# Patient Record
Sex: MALE | Race: WHITE | NOT HISPANIC OR LATINO | Employment: OTHER | URBAN - METROPOLITAN AREA
[De-identification: names, ages, dates, MRNs, and addresses within clinical notes are randomized per-mention and may not be internally consistent; named-entity substitution may affect disease eponyms.]

---

## 2020-05-13 ENCOUNTER — NEW PATIENT EMERGENCY (OUTPATIENT)
Dept: URBAN - METROPOLITAN AREA CLINIC 43 | Facility: CLINIC | Age: 75
End: 2020-05-13

## 2020-05-13 DIAGNOSIS — H35.3131: ICD-10-CM

## 2020-05-13 DIAGNOSIS — H25.093: ICD-10-CM

## 2020-05-13 DIAGNOSIS — H43.813: ICD-10-CM

## 2020-05-13 PROCEDURE — 92002 INTRM OPH EXAM NEW PATIENT: CPT

## 2020-05-13 ASSESSMENT — VISUAL ACUITY
OS_CC: 20/25-1
OD_CC: 20/30+2

## 2020-05-13 ASSESSMENT — TONOMETRY
OD_IOP_MMHG: 9
OS_IOP_MMHG: 10

## 2020-12-03 ENCOUNTER — APPOINTMENT (RX ONLY)
Dept: URBAN - METROPOLITAN AREA CLINIC 153 | Facility: CLINIC | Age: 75
Setting detail: DERMATOLOGY
End: 2020-12-03

## 2020-12-03 DIAGNOSIS — L82.1 OTHER SEBORRHEIC KERATOSIS: ICD-10-CM

## 2020-12-03 DIAGNOSIS — D18.0 HEMANGIOMA: ICD-10-CM

## 2020-12-03 DIAGNOSIS — L82.0 INFLAMED SEBORRHEIC KERATOSIS: ICD-10-CM

## 2020-12-03 DIAGNOSIS — D22 MELANOCYTIC NEVI: ICD-10-CM

## 2020-12-03 DIAGNOSIS — L57.0 ACTINIC KERATOSIS: ICD-10-CM

## 2020-12-03 PROBLEM — D18.01 HEMANGIOMA OF SKIN AND SUBCUTANEOUS TISSUE: Status: ACTIVE | Noted: 2020-12-03

## 2020-12-03 PROBLEM — D22.5 MELANOCYTIC NEVI OF TRUNK: Status: ACTIVE | Noted: 2020-12-03

## 2020-12-03 PROCEDURE — ? TREATMENT REGIMEN

## 2020-12-03 PROCEDURE — ? COUNSELING

## 2020-12-03 PROCEDURE — 99203 OFFICE O/P NEW LOW 30 MIN: CPT | Mod: 25

## 2020-12-03 PROCEDURE — 17110 DESTRUCTION B9 LES UP TO 14: CPT

## 2020-12-03 PROCEDURE — ? LIQUID NITROGEN

## 2020-12-03 PROCEDURE — ? PRESCRIPTION

## 2020-12-03 RX ORDER — FLUOROURACIL 2 G/40G
1 CREAM TOPICAL BID
Qty: 1 | Refills: 0 | Status: ERX | COMMUNITY
Start: 2020-12-03

## 2020-12-03 RX ADMIN — FLUOROURACIL 1: 2 CREAM TOPICAL at 00:00

## 2020-12-03 ASSESSMENT — LOCATION SIMPLE DESCRIPTION DERM
LOCATION SIMPLE: POSTERIOR SCALP
LOCATION SIMPLE: RIGHT NOSE
LOCATION SIMPLE: CHEST
LOCATION SIMPLE: LEFT CHEEK
LOCATION SIMPLE: LEFT UPPER ARM
LOCATION SIMPLE: RIGHT CHEEK

## 2020-12-03 ASSESSMENT — LOCATION ZONE DERM
LOCATION ZONE: TRUNK
LOCATION ZONE: ARM
LOCATION ZONE: FACE
LOCATION ZONE: NOSE
LOCATION ZONE: SCALP

## 2020-12-03 ASSESSMENT — LOCATION DETAILED DESCRIPTION DERM
LOCATION DETAILED: LEFT INFERIOR LATERAL MALAR CHEEK
LOCATION DETAILED: LEFT MEDIAL INFERIOR CHEST
LOCATION DETAILED: LEFT LATERAL INFERIOR CHEST
LOCATION DETAILED: MID-OCCIPITAL SCALP
LOCATION DETAILED: RIGHT NASAL SIDEWALL
LOCATION DETAILED: RIGHT LATERAL MALAR CHEEK
LOCATION DETAILED: LEFT PROXIMAL POSTERIOR UPPER ARM

## 2020-12-03 NOTE — PROCEDURE: TREATMENT REGIMEN
Plan: Patient to return to the office to recheck 5FU. Patient to contact the office if any problems occur for further evaluation and management.
Initiate Treatment: Efudex 5 % topical cream Apply twice daily to the scalp,cheeks and spot on right nose for 8-11 days until follow up appt.
Detail Level: Simple

## 2020-12-29 RX ORDER — FLUOROURACIL 2 G/40G
1 CREAM TOPICAL BID
Qty: 1 | Refills: 0 | Status: ERX

## 2021-01-08 ENCOUNTER — APPOINTMENT (RX ONLY)
Dept: URBAN - METROPOLITAN AREA CLINIC 153 | Facility: CLINIC | Age: 76
Setting detail: DERMATOLOGY
End: 2021-01-08

## 2021-01-08 DIAGNOSIS — L57.0 ACTINIC KERATOSIS: ICD-10-CM

## 2021-01-08 PROCEDURE — ? TREATMENT REGIMEN

## 2021-01-08 PROCEDURE — 99213 OFFICE O/P EST LOW 20 MIN: CPT

## 2021-01-08 ASSESSMENT — LOCATION SIMPLE DESCRIPTION DERM
LOCATION SIMPLE: LEFT CHEEK
LOCATION SIMPLE: ANTERIOR SCALP
LOCATION SIMPLE: NOSE
LOCATION SIMPLE: RIGHT EAR
LOCATION SIMPLE: LEFT EAR
LOCATION SIMPLE: RIGHT CHEEK

## 2021-01-08 ASSESSMENT — LOCATION ZONE DERM
LOCATION ZONE: FACE
LOCATION ZONE: EAR
LOCATION ZONE: NOSE
LOCATION ZONE: SCALP

## 2021-01-08 ASSESSMENT — LOCATION DETAILED DESCRIPTION DERM
LOCATION DETAILED: MID-FRONTAL SCALP
LOCATION DETAILED: LEFT CAVUM CONCHA
LOCATION DETAILED: LEFT CENTRAL MALAR CHEEK
LOCATION DETAILED: RIGHT ANTITRAGUS
LOCATION DETAILED: RIGHT CENTRAL MALAR CHEEK
LOCATION DETAILED: NASAL TIP

## 2021-01-19 ENCOUNTER — APPOINTMENT (RX ONLY)
Dept: URBAN - METROPOLITAN AREA CLINIC 153 | Facility: CLINIC | Age: 76
Setting detail: DERMATOLOGY
End: 2021-01-19

## 2021-01-19 DIAGNOSIS — L57.0 ACTINIC KERATOSIS: ICD-10-CM

## 2021-01-19 PROCEDURE — ? TREATMENT REGIMEN

## 2021-01-19 PROCEDURE — ? COUNSELING

## 2021-01-19 PROCEDURE — 99212 OFFICE O/P EST SF 10 MIN: CPT

## 2021-01-19 ASSESSMENT — LOCATION DETAILED DESCRIPTION DERM
LOCATION DETAILED: LEFT ANTERIOR EARLOBE
LOCATION DETAILED: RIGHT ANTERIOR EARLOBE
LOCATION DETAILED: NASAL DORSUM
LOCATION DETAILED: LEFT INFERIOR CENTRAL MALAR CHEEK

## 2021-01-19 ASSESSMENT — LOCATION SIMPLE DESCRIPTION DERM
LOCATION SIMPLE: NOSE
LOCATION SIMPLE: LEFT EAR
LOCATION SIMPLE: RIGHT EAR
LOCATION SIMPLE: LEFT CHEEK

## 2021-01-19 ASSESSMENT — LOCATION ZONE DERM
LOCATION ZONE: NOSE
LOCATION ZONE: FACE
LOCATION ZONE: EAR

## 2021-01-19 NOTE — PROCEDURE: TREATMENT REGIMEN
Detail Level: Simple
Discontinue Regimen: Patient to discontinue applying 5FU to nose, left cheek, left ear, right ear.
Continue Regimen: Patient to continue applying 5FU to scalp for 3 more days.
Plan: Patient is having a great reaction the medication. Patient will return to the office in 6 weeks to recheck 5FU. Patient to contact the office if any problems occur for further evaluation and management.

## 2021-02-16 ENCOUNTER — ESTABLISHED COMPREHENSIVE EXAM (OUTPATIENT)
Dept: URBAN - METROPOLITAN AREA CLINIC 43 | Facility: CLINIC | Age: 76
End: 2021-02-16

## 2021-02-16 DIAGNOSIS — H25.813: ICD-10-CM

## 2021-02-16 DIAGNOSIS — H35.3131: ICD-10-CM

## 2021-02-16 DIAGNOSIS — H35.363: ICD-10-CM

## 2021-02-16 PROCEDURE — 92015 DETERMINE REFRACTIVE STATE: CPT

## 2021-02-16 PROCEDURE — 92014 COMPRE OPH EXAM EST PT 1/>: CPT

## 2021-02-16 ASSESSMENT — VISUAL ACUITY
OS_PH: 20/40-1
OD_CC: 20/40+1
OD_CC: J4
OD_SC: -J12
OS_SC: 20/100
OD_SC: 20/200
OS_CC: 20/50+1
OS_SC: J12 FUZZY
OS_CC: J3

## 2021-02-16 ASSESSMENT — TONOMETRY
OD_IOP_MMHG: 15
OS_IOP_MMHG: 15

## 2021-03-02 ENCOUNTER — APPOINTMENT (RX ONLY)
Dept: URBAN - METROPOLITAN AREA CLINIC 153 | Facility: CLINIC | Age: 76
Setting detail: DERMATOLOGY
End: 2021-03-02

## 2021-03-02 DIAGNOSIS — L21.8 OTHER SEBORRHEIC DERMATITIS: ICD-10-CM | Status: WORSENING

## 2021-03-02 DIAGNOSIS — L57.0 ACTINIC KERATOSIS: ICD-10-CM | Status: IMPROVED

## 2021-03-02 DIAGNOSIS — Z71.89 OTHER SPECIFIED COUNSELING: ICD-10-CM

## 2021-03-02 PROBLEM — L30.9 DERMATITIS, UNSPECIFIED: Status: ACTIVE | Noted: 2021-03-02

## 2021-03-02 PROCEDURE — ? TREATMENT REGIMEN

## 2021-03-02 PROCEDURE — 17000 DESTRUCT PREMALG LESION: CPT

## 2021-03-02 PROCEDURE — ? LIQUID NITROGEN

## 2021-03-02 PROCEDURE — ? PRESCRIPTION

## 2021-03-02 PROCEDURE — 99213 OFFICE O/P EST LOW 20 MIN: CPT | Mod: 25

## 2021-03-02 PROCEDURE — ? COUNSELING

## 2021-03-02 RX ORDER — HYDROCORTISONE 25 MG/ML
1 LOTION TOPICAL BID
Qty: 1 | Refills: 0 | Status: ERX | COMMUNITY
Start: 2021-03-02

## 2021-03-02 RX ORDER — HYDROCORTISONE 25 MG/ML
LOTION TOPICAL BID
Qty: 1 | Refills: 0 | Status: ERX

## 2021-03-02 RX ADMIN — HYDROCORTISONE 1: 25 LOTION TOPICAL at 00:00

## 2021-03-02 ASSESSMENT — LOCATION DETAILED DESCRIPTION DERM
LOCATION DETAILED: LEFT INFERIOR MEDIAL MALAR CHEEK
LOCATION DETAILED: NASAL SUPRATIP
LOCATION DETAILED: LEFT SUPERIOR CRUS OF ANTIHELIX
LOCATION DETAILED: LEFT SUPERIOR PARIETAL SCALP
LOCATION DETAILED: RIGHT SCAPHA
LOCATION DETAILED: LEFT CENTRAL MALAR CHEEK
LOCATION DETAILED: LEFT SUPERIOR HELIX
LOCATION DETAILED: POSTERIOR MID-PARIETAL SCALP
LOCATION DETAILED: RIGHT CYMBA CONCHA

## 2021-03-02 ASSESSMENT — LOCATION ZONE DERM
LOCATION ZONE: EAR
LOCATION ZONE: FACE
LOCATION ZONE: SCALP
LOCATION ZONE: NOSE

## 2021-03-02 ASSESSMENT — LOCATION SIMPLE DESCRIPTION DERM
LOCATION SIMPLE: LEFT CHEEK
LOCATION SIMPLE: POSTERIOR SCALP
LOCATION SIMPLE: RIGHT EAR
LOCATION SIMPLE: SCALP
LOCATION SIMPLE: LEFT EAR
LOCATION SIMPLE: NOSE

## 2021-03-02 NOTE — PROCEDURE: LIQUID NITROGEN
Render Post-Care Instructions In Note?: no
Post-Care Instructions: I reviewed with the patient in detail post-care instructions. Patient is to wear sunprotection, and avoid picking at any of the treated lesions. Pt may apply Vaseline to crusted or scabbing areas.
Duration Of Freeze Thaw-Cycle (Seconds): 3
Consent: The patient's consent was obtained including but not limited to risks of crusting, scabbing, blistering, scarring, darker or lighter pigmentary change, recurrence, incomplete removal and infection.
Detail Level: Detailed
Number Of Freeze-Thaw Cycles: 1 freeze-thaw cycle

## 2021-03-02 NOTE — PROCEDURE: TREATMENT REGIMEN
Detail Level: Simple
Plan to treat with hydrocortisone 2.5 % lotion, apply twice daily to face, scalp, right ear, and left ear.  We will recheck in 10 days. Patient is aware to moisturize with Aquaphor or Vaseline and to wear sunscreen on a daily basis.  Patient is aware to contact the office if they have any questions or concerns.
Detail Level: Zone

## 2021-04-12 ENCOUNTER — CONSULT (OUTPATIENT)
Dept: URBAN - METROPOLITAN AREA CLINIC 43 | Facility: CLINIC | Age: 76
End: 2021-04-12

## 2021-04-12 DIAGNOSIS — H35.363: ICD-10-CM

## 2021-04-12 DIAGNOSIS — H35.723: ICD-10-CM

## 2021-04-12 DIAGNOSIS — H35.3132: ICD-10-CM

## 2021-04-12 DIAGNOSIS — H43.813: ICD-10-CM

## 2021-04-12 DIAGNOSIS — H34.232: ICD-10-CM

## 2021-04-12 PROCEDURE — 99214 OFFICE O/P EST MOD 30 MIN: CPT

## 2021-04-12 PROCEDURE — 92250 FUNDUS PHOTOGRAPHY W/I&R: CPT

## 2021-04-12 ASSESSMENT — TONOMETRY
OS_IOP_MMHG: 12
OD_IOP_MMHG: 11

## 2021-04-12 ASSESSMENT — VISUAL ACUITY
OD_CC: 20/30-2
OS_CC: 20/30

## 2021-05-06 ENCOUNTER — APPOINTMENT (RX ONLY)
Dept: URBAN - METROPOLITAN AREA CLINIC 153 | Facility: CLINIC | Age: 76
Setting detail: DERMATOLOGY
End: 2021-05-06

## 2021-05-06 DIAGNOSIS — L21.8 OTHER SEBORRHEIC DERMATITIS: ICD-10-CM | Status: IMPROVED

## 2021-05-06 DIAGNOSIS — L57.0 ACTINIC KERATOSIS: ICD-10-CM

## 2021-05-06 DIAGNOSIS — Z71.89 OTHER SPECIFIED COUNSELING: ICD-10-CM

## 2021-05-06 PROCEDURE — 17000 DESTRUCT PREMALG LESION: CPT

## 2021-05-06 PROCEDURE — ? LIQUID NITROGEN

## 2021-05-06 PROCEDURE — ? COUNSELING

## 2021-05-06 PROCEDURE — 99213 OFFICE O/P EST LOW 20 MIN: CPT | Mod: 25

## 2021-05-06 PROCEDURE — ? TREATMENT REGIMEN

## 2021-05-06 ASSESSMENT — LOCATION DETAILED DESCRIPTION DERM
LOCATION DETAILED: SUPERIOR MID FOREHEAD
LOCATION DETAILED: LEFT INFERIOR CENTRAL MALAR CHEEK
LOCATION DETAILED: LEFT SUPERIOR PREAURICULAR CHEEK
LOCATION DETAILED: POSTERIOR MID-PARIETAL SCALP
LOCATION DETAILED: RIGHT CHIN
LOCATION DETAILED: RIGHT CAVUM CONCHA
LOCATION DETAILED: RIGHT CENTRAL MALAR CHEEK
LOCATION DETAILED: LEFT CRUS OF HELIX

## 2021-05-06 ASSESSMENT — LOCATION SIMPLE DESCRIPTION DERM
LOCATION SIMPLE: LEFT CHEEK
LOCATION SIMPLE: LEFT EAR
LOCATION SIMPLE: RIGHT EAR
LOCATION SIMPLE: CHIN
LOCATION SIMPLE: RIGHT CHEEK
LOCATION SIMPLE: POSTERIOR SCALP
LOCATION SIMPLE: SUPERIOR FOREHEAD

## 2021-05-06 ASSESSMENT — LOCATION ZONE DERM
LOCATION ZONE: EAR
LOCATION ZONE: FACE
LOCATION ZONE: SCALP

## 2021-05-06 NOTE — PROCEDURE: TREATMENT REGIMEN
Plan: Patient to discontinue hydrocortisone 2.5% lotion at this time. Patient to apply hydrocortisone 2.5% lotion as needed for scaling and flaking on scalp, face and ears. Patient is aware to not apply more then two weeks at a time. Upon examining found residual actinic keratosis present, will treat today with liquid nitrogen. Patient is agreeable. Patient to moisturize and apply sunscreen on a daily basis. Patient is aware to contact the office if he has any questions, problems or concerns.
Detail Level: Zone

## 2021-05-21 ENCOUNTER — APPOINTMENT (RX ONLY)
Dept: URBAN - METROPOLITAN AREA CLINIC 153 | Facility: CLINIC | Age: 76
Setting detail: DERMATOLOGY
End: 2021-05-21

## 2021-05-21 DIAGNOSIS — B07.8 OTHER VIRAL WARTS: ICD-10-CM

## 2021-05-21 PROCEDURE — ? LIQUID NITROGEN

## 2021-05-21 PROCEDURE — 17110 DESTRUCTION B9 LES UP TO 14: CPT

## 2021-05-21 ASSESSMENT — LOCATION DETAILED DESCRIPTION DERM: LOCATION DETAILED: LEFT DORSAL RING METACARPOPHALANGEAL JOINT

## 2021-05-21 ASSESSMENT — LOCATION ZONE DERM: LOCATION ZONE: HAND

## 2021-05-21 ASSESSMENT — LOCATION SIMPLE DESCRIPTION DERM: LOCATION SIMPLE: LEFT HAND

## 2021-05-21 NOTE — PROCEDURE: LIQUID NITROGEN
Add 52 Modifier (Optional): no
Medical Necessity Information: It is in your best interest to select a reason for this procedure from the list below. All of these items fulfill various CMS LCD requirements except the new and changing color options.
Detail Level: Detailed
Medical Necessity Clause: This procedure was medically necessary because the lesions that were treated were:
Duration Of Freeze Thaw-Cycle (Seconds): 3
Post-Care Instructions: I reviewed with the patient in detail post-care instructions. Patient is to wear sunprotection, and avoid picking at any of the treated lesions. Pt may apply Vaseline to crusted or scabbing areas. If any area treated is still present after four weeks patient was instructed to contact the office for further evaluation.
Number Of Freeze-Thaw Cycles: 3 freeze-thaw cycles
Consent: The patient's verbal consent was obtained including but not limited to risks of crusting, scabbing, blistering, scarring, darker or lighter pigmentary change, recurrence, incomplete removal and infection.

## 2022-02-18 ENCOUNTER — COMPREHENSIVE EXAM (OUTPATIENT)
Dept: URBAN - METROPOLITAN AREA CLINIC 43 | Facility: CLINIC | Age: 77
End: 2022-02-18

## 2022-02-18 DIAGNOSIS — H25.813: ICD-10-CM

## 2022-02-18 DIAGNOSIS — H34.232: ICD-10-CM

## 2022-02-18 DIAGNOSIS — H35.363: ICD-10-CM

## 2022-02-18 DIAGNOSIS — H52.4: ICD-10-CM

## 2022-02-18 DIAGNOSIS — H35.3132: ICD-10-CM

## 2022-02-18 DIAGNOSIS — H04.123: ICD-10-CM

## 2022-02-18 PROCEDURE — 92015 DETERMINE REFRACTIVE STATE: CPT

## 2022-02-18 PROCEDURE — 92014 COMPRE OPH EXAM EST PT 1/>: CPT

## 2022-02-18 ASSESSMENT — TONOMETRY
OS_IOP_MMHG: 11
OD_IOP_MMHG: 11

## 2022-02-18 ASSESSMENT — VISUAL ACUITY
OD_SC: 20/200
OD_SC: J12
OD_CC: J8
OS_SC: J12
OD_CC: 20/40-2
OS_BAT: 20/400
OD_BAT: 20/70-2
OS_SC: 20/80
OS_CC: J6
OS_CC: 20/40-2

## 2022-04-22 ENCOUNTER — COMPREHENSIVE EXAM (OUTPATIENT)
Dept: URBAN - METROPOLITAN AREA CLINIC 43 | Facility: CLINIC | Age: 77
End: 2022-04-22

## 2022-04-22 DIAGNOSIS — H34.232: ICD-10-CM

## 2022-04-22 DIAGNOSIS — H35.363: ICD-10-CM

## 2022-04-22 DIAGNOSIS — H35.723: ICD-10-CM

## 2022-04-22 DIAGNOSIS — H35.3132: ICD-10-CM

## 2022-04-22 DIAGNOSIS — G45.3: ICD-10-CM

## 2022-04-22 DIAGNOSIS — H35.30: ICD-10-CM

## 2022-04-22 DIAGNOSIS — H43.813: ICD-10-CM

## 2022-04-22 PROCEDURE — 99214 OFFICE O/P EST MOD 30 MIN: CPT

## 2022-04-22 PROCEDURE — 92250 FUNDUS PHOTOGRAPHY W/I&R: CPT

## 2022-04-22 ASSESSMENT — VISUAL ACUITY
OD_CC: 20/40+1
OS_PH: 20/50-1
OS_CC: 20/60+2

## 2022-04-22 ASSESSMENT — TONOMETRY
OS_IOP_MMHG: 9
OD_IOP_MMHG: 10

## 2022-11-04 ENCOUNTER — COMPREHENSIVE EXAM (OUTPATIENT)
Dept: URBAN - METROPOLITAN AREA CLINIC 43 | Facility: CLINIC | Age: 77
End: 2022-11-04

## 2022-11-04 DIAGNOSIS — H35.30: ICD-10-CM

## 2022-11-04 DIAGNOSIS — H35.363: ICD-10-CM

## 2022-11-04 DIAGNOSIS — H35.723: ICD-10-CM

## 2022-11-04 DIAGNOSIS — H34.232: ICD-10-CM

## 2022-11-04 DIAGNOSIS — H25.813: ICD-10-CM

## 2022-11-04 DIAGNOSIS — G45.3: ICD-10-CM

## 2022-11-04 DIAGNOSIS — H43.813: ICD-10-CM

## 2022-11-04 DIAGNOSIS — H04.123: ICD-10-CM

## 2022-11-04 DIAGNOSIS — H10.13: ICD-10-CM

## 2022-11-04 DIAGNOSIS — H35.3132: ICD-10-CM

## 2022-11-04 PROCEDURE — 92273 FULL FIELD ERG W/I&R: CPT

## 2022-11-04 PROCEDURE — 92250 FUNDUS PHOTOGRAPHY W/I&R: CPT

## 2022-11-04 PROCEDURE — 99214 OFFICE O/P EST MOD 30 MIN: CPT

## 2022-11-04 ASSESSMENT — TONOMETRY
OS_IOP_MMHG: 11
OD_IOP_MMHG: 11

## 2022-11-04 ASSESSMENT — VISUAL ACUITY
OS_CC: 20/50+2
OD_CC: 20/40+1

## 2023-09-06 ENCOUNTER — CONSULTATION/EVALUATION (OUTPATIENT)
Dept: URBAN - METROPOLITAN AREA CLINIC 43 | Facility: CLINIC | Age: 78
End: 2023-09-06

## 2023-09-06 DIAGNOSIS — H34.232: ICD-10-CM

## 2023-09-06 DIAGNOSIS — H35.3132: ICD-10-CM

## 2023-09-06 DIAGNOSIS — H04.123: ICD-10-CM

## 2023-09-06 DIAGNOSIS — H25.813: ICD-10-CM

## 2023-09-06 DIAGNOSIS — H35.723: ICD-10-CM

## 2023-09-06 PROCEDURE — 99214 OFFICE O/P EST MOD 30 MIN: CPT

## 2023-09-06 PROCEDURE — 92025-1 CORNEAL TOPOGRAPHY, INS

## 2023-09-06 PROCEDURE — V2799PMN IMPRIMIS PRED-MOXI-NEPAF 5ML

## 2023-09-06 PROCEDURE — 92136TC INTERFEROMETRY - TECHNICAL COMPONENT

## 2023-09-06 PROCEDURE — 92134 CPTRZ OPH DX IMG PST SGM RTA: CPT

## 2023-09-06 ASSESSMENT — VISUAL ACUITY
OS_CC: 20/40+2
OD_CC: 20/50
OD_CC: J2
OS_CC: J2

## 2023-09-06 ASSESSMENT — TONOMETRY
OS_IOP_MMHG: 16
OD_IOP_MMHG: 15

## 2023-09-22 ENCOUNTER — PRE-OP/H&P (OUTPATIENT)
Dept: URBAN - METROPOLITAN AREA SURGERY 14 | Facility: SURGERY | Age: 78
End: 2023-09-22

## 2023-09-22 ENCOUNTER — TECH ONLY (OUTPATIENT)
Dept: URBAN - METROPOLITAN AREA CLINIC 39 | Facility: CLINIC | Age: 78
End: 2023-09-22

## 2023-09-22 ENCOUNTER — SURGERY/PROCEDURE (OUTPATIENT)
Dept: URBAN - METROPOLITAN AREA CLINIC 43 | Facility: CLINIC | Age: 78
End: 2023-09-22

## 2023-09-22 DIAGNOSIS — H04.123: ICD-10-CM

## 2023-09-22 DIAGNOSIS — H25.813: ICD-10-CM

## 2023-09-22 DIAGNOSIS — H35.3132: ICD-10-CM

## 2023-09-22 DIAGNOSIS — Z96.1: ICD-10-CM

## 2023-09-22 DIAGNOSIS — H34.232: ICD-10-CM

## 2023-09-22 PROCEDURE — 66999LNSR LENSAR LASER FOR CAT SX

## 2023-09-22 PROCEDURE — 66984CV REMOVE CATARACT, INSERT LENS, CUSTOM VISION

## 2023-09-22 PROCEDURE — 99211T TECH SERVICE

## 2023-09-22 PROCEDURE — 99211HP H&P OFFICE/OUTPATIENT VISIT, EST

## 2023-09-22 PROCEDURE — 65772LRI LRI DURING CAT SX

## 2023-09-22 ASSESSMENT — TONOMETRY: OD_IOP_MMHG: 9

## 2023-09-22 ASSESSMENT — VISUAL ACUITY: OD_SC: 20/50-1

## 2023-09-25 ENCOUNTER — POST-OP (OUTPATIENT)
Dept: URBAN - METROPOLITAN AREA CLINIC 43 | Facility: CLINIC | Age: 78
End: 2023-09-25

## 2023-09-25 DIAGNOSIS — H04.123: ICD-10-CM

## 2023-09-25 DIAGNOSIS — H35.3132: ICD-10-CM

## 2023-09-25 DIAGNOSIS — Z96.1: ICD-10-CM

## 2023-09-25 DIAGNOSIS — H25.812: ICD-10-CM

## 2023-09-25 PROCEDURE — 99024 POSTOP FOLLOW-UP VISIT: CPT

## 2023-09-25 PROCEDURE — 99213 OFFICE O/P EST LOW 20 MIN: CPT

## 2023-09-25 ASSESSMENT — TONOMETRY
OS_IOP_MMHG: 10
OD_IOP_MMHG: 07

## 2023-09-25 ASSESSMENT — VISUAL ACUITY
OS_SC: J12
OD_SC: 20/25+1
OU_SC: J6
OS_PH: 20/30-2
OD_SC: J8
OU_SC: 20/20
OS_SC: 20/80+2

## 2023-09-27 ENCOUNTER — POST OP/EVAL OF SECOND EYE (OUTPATIENT)
Dept: URBAN - METROPOLITAN AREA CLINIC 43 | Facility: CLINIC | Age: 78
End: 2023-09-27

## 2023-09-27 DIAGNOSIS — Z96.1: ICD-10-CM

## 2023-09-27 DIAGNOSIS — H35.3132: ICD-10-CM

## 2023-09-27 DIAGNOSIS — H25.812: ICD-10-CM

## 2023-09-27 DIAGNOSIS — H04.123: ICD-10-CM

## 2023-09-27 PROCEDURE — 99024 POSTOP FOLLOW-UP VISIT: CPT

## 2023-09-27 PROCEDURE — 99213 OFFICE O/P EST LOW 20 MIN: CPT

## 2023-09-27 ASSESSMENT — VISUAL ACUITY
OD_SC: 20/25+1
OS_SC: J12
OD_SC: J8
OS_PH: 20/30
OS_SC: 20/60-1

## 2023-09-27 ASSESSMENT — TONOMETRY
OS_IOP_MMHG: 8
OD_IOP_MMHG: 6

## 2023-09-29 ENCOUNTER — PRE-OP/H&P (OUTPATIENT)
Dept: URBAN - METROPOLITAN AREA SURGERY 14 | Facility: SURGERY | Age: 78
End: 2023-09-29

## 2023-09-29 ENCOUNTER — SURGERY/PROCEDURE (OUTPATIENT)
Dept: URBAN - METROPOLITAN AREA CLINIC 43 | Facility: CLINIC | Age: 78
End: 2023-09-29

## 2023-09-29 ENCOUNTER — TECH ONLY (OUTPATIENT)
Dept: URBAN - METROPOLITAN AREA CLINIC 39 | Facility: CLINIC | Age: 78
End: 2023-09-29

## 2023-09-29 DIAGNOSIS — H25.812: ICD-10-CM

## 2023-09-29 DIAGNOSIS — Z96.1: ICD-10-CM

## 2023-09-29 DIAGNOSIS — H57.03: ICD-10-CM

## 2023-09-29 PROCEDURE — 99211T TECH SERVICE

## 2023-09-29 PROCEDURE — 99211HP H&P OFFICE/OUTPATIENT VISIT, EST

## 2023-09-29 PROCEDURE — 65772LRI LRI DURING CAT SX

## 2023-09-29 PROCEDURE — 66982CV COMPLEX CATARACT WITH IOL, CUSTOM VISION

## 2023-09-29 PROCEDURE — 66999LNSR LENSAR LASER FOR CAT SX

## 2023-09-29 ASSESSMENT — VISUAL ACUITY: OS_SC: 20/60

## 2023-09-29 ASSESSMENT — TONOMETRY: OS_IOP_MMHG: 16

## 2023-10-02 ENCOUNTER — POST-OP (OUTPATIENT)
Dept: URBAN - METROPOLITAN AREA CLINIC 43 | Facility: CLINIC | Age: 78
End: 2023-10-02

## 2023-10-02 DIAGNOSIS — Z96.1: ICD-10-CM

## 2023-10-02 PROCEDURE — 99024 POSTOP FOLLOW-UP VISIT: CPT

## 2023-10-02 ASSESSMENT — TONOMETRY
OD_IOP_MMHG: 14
OS_IOP_MMHG: 15

## 2023-10-02 ASSESSMENT — VISUAL ACUITY
OD_SC: 20/25+2
OS_SC: 20/25-2

## 2023-10-09 ENCOUNTER — ESTABLISHED PATIENT (OUTPATIENT)
Dept: URBAN - METROPOLITAN AREA CLINIC 43 | Facility: CLINIC | Age: 78
End: 2023-10-09

## 2023-10-09 DIAGNOSIS — H35.731: ICD-10-CM

## 2023-10-09 DIAGNOSIS — Z96.1: ICD-10-CM

## 2023-10-09 DIAGNOSIS — H34.232: ICD-10-CM

## 2023-10-09 DIAGNOSIS — G45.3: ICD-10-CM

## 2023-10-09 DIAGNOSIS — H04.123: ICD-10-CM

## 2023-10-09 DIAGNOSIS — H35.30: ICD-10-CM

## 2023-10-09 DIAGNOSIS — H35.3211: ICD-10-CM

## 2023-10-09 DIAGNOSIS — H35.3122: ICD-10-CM

## 2023-10-09 DIAGNOSIS — H35.363: ICD-10-CM

## 2023-10-09 DIAGNOSIS — H35.722: ICD-10-CM

## 2023-10-09 DIAGNOSIS — H43.813: ICD-10-CM

## 2023-10-09 PROCEDURE — 99214 OFFICE O/P EST MOD 30 MIN: CPT | Mod: 24,25

## 2023-10-09 PROCEDURE — 67028 INJECTION EYE DRUG: CPT

## 2023-10-09 PROCEDURE — J3490AVA AVASTIN *

## 2023-10-09 PROCEDURE — 92250 FUNDUS PHOTOGRAPHY W/I&R: CPT

## 2023-10-09 ASSESSMENT — TONOMETRY
OD_IOP_MMHG: 06
OS_IOP_MMHG: 07

## 2023-10-09 ASSESSMENT — VISUAL ACUITY
OD_SC: 20/20-1
OS_SC: 20/25-2

## 2023-11-13 ENCOUNTER — CLINIC PROCEDURE ONLY (OUTPATIENT)
Dept: URBAN - METROPOLITAN AREA CLINIC 43 | Facility: CLINIC | Age: 78
End: 2023-11-13

## 2023-11-13 DIAGNOSIS — H35.3211: ICD-10-CM

## 2023-11-13 DIAGNOSIS — H35.731: ICD-10-CM

## 2023-11-13 PROCEDURE — 67028 INJECTION EYE DRUG: CPT

## 2023-11-13 PROCEDURE — J3490AVA AVASTIN *

## 2023-11-13 PROCEDURE — 92250 FUNDUS PHOTOGRAPHY W/I&R: CPT

## 2023-11-13 ASSESSMENT — VISUAL ACUITY
OD_SC: 20/20
OS_SC: 20/20-1

## 2023-11-13 ASSESSMENT — TONOMETRY
OS_IOP_MMHG: 08
OD_IOP_MMHG: 07

## 2024-01-26 ENCOUNTER — ESTABLISHED PATIENT (OUTPATIENT)
Dept: URBAN - METROPOLITAN AREA CLINIC 43 | Facility: CLINIC | Age: 79
End: 2024-01-26

## 2024-01-26 DIAGNOSIS — H35.722: ICD-10-CM

## 2024-01-26 DIAGNOSIS — H43.813: ICD-10-CM

## 2024-01-26 DIAGNOSIS — H35.731: ICD-10-CM

## 2024-01-26 DIAGNOSIS — H35.30: ICD-10-CM

## 2024-01-26 DIAGNOSIS — H34.232: ICD-10-CM

## 2024-01-26 DIAGNOSIS — H35.363: ICD-10-CM

## 2024-01-26 DIAGNOSIS — Z96.1: ICD-10-CM

## 2024-01-26 DIAGNOSIS — H04.123: ICD-10-CM

## 2024-01-26 DIAGNOSIS — H35.3211: ICD-10-CM

## 2024-01-26 DIAGNOSIS — H35.3122: ICD-10-CM

## 2024-01-26 PROCEDURE — 67028 INJECTION EYE DRUG: CPT

## 2024-01-26 PROCEDURE — 99213 OFFICE O/P EST LOW 20 MIN: CPT

## 2024-01-26 PROCEDURE — J3490AVA AVASTIN *

## 2024-01-26 PROCEDURE — 92250 FUNDUS PHOTOGRAPHY W/I&R: CPT

## 2024-01-26 ASSESSMENT — VISUAL ACUITY
OS_SC: 20/20-1
OD_SC: 20/20

## 2024-01-26 ASSESSMENT — TONOMETRY
OS_IOP_MMHG: 09
OD_IOP_MMHG: 08

## 2024-03-22 ENCOUNTER — ESTABLISHED PATIENT (OUTPATIENT)
Dept: URBAN - METROPOLITAN AREA CLINIC 43 | Facility: CLINIC | Age: 79
End: 2024-03-22

## 2024-03-22 DIAGNOSIS — H35.722: ICD-10-CM

## 2024-03-22 DIAGNOSIS — H35.363: ICD-10-CM

## 2024-03-22 DIAGNOSIS — H43.813: ICD-10-CM

## 2024-03-22 DIAGNOSIS — H34.232: ICD-10-CM

## 2024-03-22 DIAGNOSIS — H35.731: ICD-10-CM

## 2024-03-22 DIAGNOSIS — H04.123: ICD-10-CM

## 2024-03-22 DIAGNOSIS — H35.30: ICD-10-CM

## 2024-03-22 DIAGNOSIS — H35.3122: ICD-10-CM

## 2024-03-22 DIAGNOSIS — H35.3211: ICD-10-CM

## 2024-03-22 PROCEDURE — 92273 FULL FIELD ERG W/I&R: CPT

## 2024-03-22 PROCEDURE — 99214 OFFICE O/P EST MOD 30 MIN: CPT

## 2024-03-22 PROCEDURE — J0178PRE EYLEA PREFILLED SYRINGE

## 2024-03-22 PROCEDURE — 67028 INJECTION EYE DRUG: CPT

## 2024-03-22 PROCEDURE — 92250 FUNDUS PHOTOGRAPHY W/I&R: CPT

## 2024-03-22 ASSESSMENT — VISUAL ACUITY
OD_SC: 20/20
OS_SC: 20/20-1

## 2024-03-22 ASSESSMENT — TONOMETRY
OD_IOP_MMHG: 08
OS_IOP_MMHG: 08

## 2024-05-28 ENCOUNTER — ESTABLISHED PATIENT (OUTPATIENT)
Dept: URBAN - METROPOLITAN AREA CLINIC 46 | Facility: CLINIC | Age: 79
End: 2024-05-28

## 2024-05-28 DIAGNOSIS — H34.232: ICD-10-CM

## 2024-05-28 DIAGNOSIS — H35.3122: ICD-10-CM

## 2024-05-28 DIAGNOSIS — H04.123: ICD-10-CM

## 2024-05-28 DIAGNOSIS — H35.3211: ICD-10-CM

## 2024-05-28 DIAGNOSIS — H35.30: ICD-10-CM

## 2024-05-28 DIAGNOSIS — H43.813: ICD-10-CM

## 2024-05-28 DIAGNOSIS — H35.731: ICD-10-CM

## 2024-05-28 DIAGNOSIS — H35.722: ICD-10-CM

## 2024-05-28 DIAGNOSIS — H35.363: ICD-10-CM

## 2024-05-28 PROCEDURE — 92250 FUNDUS PHOTOGRAPHY W/I&R: CPT

## 2024-05-28 PROCEDURE — 99213 OFFICE O/P EST LOW 20 MIN: CPT | Mod: 25

## 2024-05-28 PROCEDURE — J0178PRE EYLEA PREFILLED SYRINGE: Mod: JZ,RT

## 2024-05-28 PROCEDURE — 92134 CPTRZ OPH DX IMG PST SGM RTA: CPT | Mod: NC

## 2024-05-28 PROCEDURE — 67028 INJECTION EYE DRUG: CPT

## 2024-05-28 ASSESSMENT — VISUAL ACUITY
OS_SC: 20/20-1
OD_SC: 20/20

## 2024-05-28 ASSESSMENT — TONOMETRY
OD_IOP_MMHG: 6
OS_IOP_MMHG: 6

## 2024-09-19 ENCOUNTER — EMERGENCY VISIT (OUTPATIENT)
Dept: URBAN - METROPOLITAN AREA CLINIC 43 | Facility: CLINIC | Age: 79
End: 2024-09-19

## 2024-09-19 DIAGNOSIS — H04.123: ICD-10-CM

## 2024-09-19 DIAGNOSIS — Z96.1: ICD-10-CM

## 2024-09-19 DIAGNOSIS — H00.025: ICD-10-CM

## 2024-09-19 DIAGNOSIS — H35.363: ICD-10-CM

## 2024-09-19 DIAGNOSIS — H35.30: ICD-10-CM

## 2024-09-19 DIAGNOSIS — H43.813: ICD-10-CM

## 2024-09-19 DIAGNOSIS — G45.3: ICD-10-CM

## 2024-09-19 PROCEDURE — 92012 INTRM OPH EXAM EST PATIENT: CPT

## 2024-09-19 RX ORDER — NEOMYCIN SULFATE, POLYMYXIN B SULFATE AND DEXAMETHASONE 3.5; 10000; 1 MG/ML; [USP'U]/ML; MG/ML: 1 SUSPENSION OPHTHALMIC

## 2024-11-06 ENCOUNTER — COMPREHENSIVE EXAM (OUTPATIENT)
Dept: URBAN - METROPOLITAN AREA CLINIC 43 | Facility: CLINIC | Age: 79
End: 2024-11-06

## 2024-11-06 DIAGNOSIS — H35.3211: ICD-10-CM

## 2024-11-06 DIAGNOSIS — H35.722: ICD-10-CM

## 2024-11-06 DIAGNOSIS — H35.731: ICD-10-CM

## 2024-11-06 DIAGNOSIS — H35.3122: ICD-10-CM

## 2024-11-06 DIAGNOSIS — H01.003: ICD-10-CM

## 2024-11-06 DIAGNOSIS — H34.232: ICD-10-CM

## 2024-11-06 DIAGNOSIS — H01.006: ICD-10-CM

## 2024-11-06 PROCEDURE — 67028 INJECTION EYE DRUG: CPT

## 2024-11-06 PROCEDURE — J0178PRE EYLEA PREFILLED SYRINGE: Mod: JZ,RT

## 2024-11-06 PROCEDURE — 99214 OFFICE O/P EST MOD 30 MIN: CPT | Mod: 25

## 2024-11-06 PROCEDURE — 92134 CPTRZ OPH DX IMG PST SGM RTA: CPT | Mod: NC

## 2024-11-06 PROCEDURE — 92250 FUNDUS PHOTOGRAPHY W/I&R: CPT

## 2024-11-06 PROCEDURE — 92273 FULL FIELD ERG W/I&R: CPT

## 2025-03-12 ENCOUNTER — COMPREHENSIVE EXAM (OUTPATIENT)
Age: 80
End: 2025-03-12

## 2025-03-12 DIAGNOSIS — H01.003: ICD-10-CM

## 2025-03-12 DIAGNOSIS — H35.3122: ICD-10-CM

## 2025-03-12 DIAGNOSIS — H34.232: ICD-10-CM

## 2025-03-12 DIAGNOSIS — H35.3211: ICD-10-CM

## 2025-03-12 DIAGNOSIS — H35.731: ICD-10-CM

## 2025-03-12 DIAGNOSIS — H01.006: ICD-10-CM

## 2025-03-12 DIAGNOSIS — H35.722: ICD-10-CM

## 2025-03-12 PROCEDURE — 99213 OFFICE O/P EST LOW 20 MIN: CPT | Mod: 25

## 2025-03-12 PROCEDURE — 67028 INJECTION EYE DRUG: CPT

## 2025-03-12 PROCEDURE — J0178PRE EYLEA PREFILLED SYRINGE: Mod: JZ,RT

## 2025-03-12 PROCEDURE — 92134 CPTRZ OPH DX IMG PST SGM RTA: CPT

## 2025-07-14 ENCOUNTER — COMPREHENSIVE EXAM (OUTPATIENT)
Age: 80
End: 2025-07-14

## 2025-07-14 DIAGNOSIS — H35.731: ICD-10-CM

## 2025-07-14 DIAGNOSIS — H04.123: ICD-10-CM

## 2025-07-14 DIAGNOSIS — H35.722: ICD-10-CM

## 2025-07-14 DIAGNOSIS — H35.3122: ICD-10-CM

## 2025-07-14 DIAGNOSIS — H35.3211: ICD-10-CM

## 2025-07-14 DIAGNOSIS — H34.232: ICD-10-CM

## 2025-07-14 PROCEDURE — J0178PRE EYLEA PREFILLED SYRINGE: Mod: JZ,RT

## 2025-07-14 PROCEDURE — 92250 FUNDUS PHOTOGRAPHY W/I&R: CPT

## 2025-07-14 PROCEDURE — 67028 INJECTION EYE DRUG: CPT

## 2025-07-14 PROCEDURE — 99213 OFFICE O/P EST LOW 20 MIN: CPT | Mod: 25
